# Patient Record
Sex: MALE | Race: WHITE | NOT HISPANIC OR LATINO | URBAN - METROPOLITAN AREA
[De-identification: names, ages, dates, MRNs, and addresses within clinical notes are randomized per-mention and may not be internally consistent; named-entity substitution may affect disease eponyms.]

---

## 2020-06-28 ENCOUNTER — HOSPITAL ENCOUNTER (EMERGENCY)
Facility: HOSPITAL | Age: 65
Discharge: HOME | End: 2020-06-28
Attending: EMERGENCY MEDICINE | Admitting: EMERGENCY MEDICINE
Payer: COMMERCIAL

## 2020-06-28 VITALS
HEIGHT: 73 IN | RESPIRATION RATE: 19 BRPM | SYSTOLIC BLOOD PRESSURE: 110 MMHG | HEART RATE: 85 BPM | TEMPERATURE: 97.3 F | DIASTOLIC BLOOD PRESSURE: 61 MMHG | WEIGHT: 193 LBS | BODY MASS INDEX: 25.58 KG/M2 | OXYGEN SATURATION: 92 %

## 2020-06-28 DIAGNOSIS — S61.313A LACERATION OF LEFT MIDDLE FINGER WITHOUT FOREIGN BODY WITH DAMAGE TO NAIL, INITIAL ENCOUNTER: Primary | ICD-10-CM

## 2020-06-28 PROCEDURE — 99282 EMERGENCY DEPT VISIT SF MDM: CPT | Performed by: PHYSICIAN ASSISTANT

## 2020-06-28 PROCEDURE — 90715 TDAP VACCINE 7 YRS/> IM: CPT | Performed by: PHYSICIAN ASSISTANT

## 2020-06-28 PROCEDURE — 99283 EMERGENCY DEPT VISIT LOW MDM: CPT

## 2020-06-28 PROCEDURE — 12001 RPR S/N/AX/GEN/TRNK 2.5CM/<: CPT | Performed by: PHYSICIAN ASSISTANT

## 2020-06-28 PROCEDURE — 90471 IMMUNIZATION ADMIN: CPT

## 2020-06-28 RX ORDER — LISINOPRIL 20 MG/1
20 TABLET ORAL DAILY
COMMUNITY

## 2020-06-28 RX ORDER — LIDOCAINE HYDROCHLORIDE 10 MG/ML
5 INJECTION, SOLUTION EPIDURAL; INFILTRATION; INTRACAUDAL; PERINEURAL ONCE
Status: COMPLETED | OUTPATIENT
Start: 2020-06-28 | End: 2020-06-28

## 2020-06-28 RX ORDER — OMEPRAZOLE 40 MG/1
40 CAPSULE, DELAYED RELEASE ORAL DAILY
COMMUNITY

## 2020-06-28 RX ADMIN — LIDOCAINE HYDROCHLORIDE 5 ML: 10 INJECTION, SOLUTION EPIDURAL; INFILTRATION; INTRACAUDAL; PERINEURAL at 13:39

## 2020-06-28 RX ADMIN — TETANUS TOXOID, REDUCED DIPHTHERIA TOXOID AND ACELLULAR PERTUSSIS VACCINE, ADSORBED 0.5 ML: 5; 2.5; 8; 8; 2.5 SUSPENSION INTRAMUSCULAR at 13:39

## 2020-06-28 NOTE — ED PROVIDER NOTES
History  Chief Complaint   Patient presents with    Finger Injury     pt states he caught his left middle finger in the hedge clipper about an hour ago  pt went to urgent care and was sent here for sutures  17-year-old male with history of diabetes and hypertension presents emergency department for evaluation of left middle finger laceration  He struck the finger on head clippers while working outside  Bleeding is controlled, does not take any anticoagulants  Last tetanus is unknown  Prior to Admission Medications   Prescriptions Last Dose Informant Patient Reported? Taking? UNKNOWN TO PATIENT   Yes Yes   insulin aspart (NovoLOG) 100 units/mL injection   Yes Yes   Sig: Inject 8 Units under the skin 3 (three) times a day before meals   lisinopril (ZESTRIL) 20 mg tablet   Yes No   Sig: Take 20 mg by mouth daily   omeprazole (PriLOSEC) 40 MG capsule   Yes No   Sig: Take 40 mg by mouth daily      Facility-Administered Medications: None       Past Medical History:   Diagnosis Date    Diabetes mellitus ([facility] [address] )     Hypertension        Past Surgical History:   Procedure Laterality Date    ABDOMINAL SURGERY      CHOLECYSTECTOMY      PANCREAS SURGERY         History reviewed  No pertinent family history  I have reviewed and agree with the history as documented  E-Cigarette/Vaping     E-Cigarette/Vaping Substances     Social History     Tobacco Use    Smoking status: Never Smoker    Smokeless tobacco: Never Used   Substance Use Topics    Alcohol use: Yes     Frequency: 4 or more times a week    Drug use: Never       Review of Systems   Constitutional: Negative for fever  Musculoskeletal: Negative for arthralgias, joint swelling and myalgias  Skin: Positive for wound  Negative for color change  Neurological: Negative for weakness and numbness  All other systems reviewed and are negative  Physical Exam  Physical Exam   Constitutional: He is oriented to person, place, and time   He appears well-developed and well-nourished  No distress  HENT:   Head: Normocephalic and atraumatic  Eyes: Pupils are equal, round, and reactive to light  No scleral icterus  Neck: No JVD present  Cardiovascular: Normal rate and regular rhythm  Exam reveals no gallop and no friction rub  No murmur heard  Pulmonary/Chest: No respiratory distress  He has no wheezes  He has no rales  Musculoskeletal:   Parallel 1 5 cm lacerations to the left middle finger pad  The lateral of which extends through the nail plate and bed  Bleeding is controlled with pressure  Range of motion of the interphalangeal joints is normal   Neurological: He is alert and oriented to person, place, and time  Skin: Skin is warm and dry  Capillary refill takes less than 2 seconds  He is not diaphoretic  Psychiatric: He has a normal mood and affect  His behavior is normal    Vitals reviewed        Vital Signs  ED Triage Vitals   Temperature Pulse Respirations Blood Pressure SpO2   06/28/20 1255 06/28/20 1250 06/28/20 1250 06/28/20 1250 06/28/20 1250   (!) 97 3 °F (36 3 °C) 85 19 110/61 92 %      Temp Source Heart Rate Source Patient Position - Orthostatic VS BP Location FiO2 (%)   06/28/20 1255 06/28/20 1250 06/28/20 1250 06/28/20 1250 --   Tympanic Monitor Lying Right arm       Pain Score       06/28/20 1250       3           Vitals:    06/28/20 1250   BP: 110/61   Pulse: 85   Patient Position - Orthostatic VS: Lying         Visual Acuity      ED Medications  Medications   lidocaine (PF) (XYLOCAINE-MPF) 1 % injection 5 mL (5 mL Infiltration Given by Other 6/28/20 5049)   tetanus-diphtheria-acellular pertussis (BOOSTRIX) IM injection 0 5 mL (0 5 mL Intramuscular Given 6/28/20 1339)       Diagnostic Studies  Results Reviewed     None                 No orders to display              Procedures  Laceration repair  Date/Time: 6/28/2020 1:50 PM  Performed by: Marlyne Paget, PA-C  Authorized by: Marlyne Paget, PA-C   Consent: Verbal consent obtained  Risks and benefits: risks, benefits and alternatives were discussed  Consent given by: patient  Patient understanding: patient states understanding of the procedure being performed  Patient consent: the patient's understanding of the procedure matches consent given  Procedure consent: procedure consent matches procedure scheduled  Relevant documents: relevant documents present and verified  Required items: required blood products, implants, devices, and special equipment available  Patient identity confirmed: verbally with patient  Time out: Immediately prior to procedure a "time out" was called to verify the correct patient, procedure, equipment, support staff and site/side marked as required  Body area: upper extremity  Location details: left long finger  Laceration length: 1 5 cm  Foreign bodies: no foreign bodies  Tendon involvement: none  Nerve involvement: none  Vascular damage: no  Anesthesia: digital block    Anesthesia:  Local Anesthetic: lidocaine 1% without epinephrine  Anesthetic total: 3 mL    Sedation:  Patient sedated: no        Procedure Details:  Preparation: Patient was prepped and draped in the usual sterile fashion  Irrigation solution: saline  Irrigation method: syringe  Amount of cleaning: standard  Debridement: none  Degree of undermining: none  Skin closure: 5-0 nylon  Number of sutures: 4  Technique: simple  Approximation: close  Approximation difficulty: simple  Dressing: 4x4 sterile gauze  Patient tolerance: Patient tolerated the procedure well with no immediate complications               ED Course       US AUDIT      Most Recent Value   Initial Alcohol Screen: US AUDIT-C    1  How often do you have a drink containing alcohol?  0 Filed at: 06/28/2020 1251   2  How many drinks containing alcohol do you have on a typical day you are drinking? 0 Filed at: 06/28/2020 1251   3a  Male UNDER 65: How often do you have five or more drinks on one occasion?   0 Filed at: 06/28/2020 1251   3b  FEMALE Any Age, or MALE 65+: How often do you have 4 or more drinks on one occassion? 0 Filed at: 06/28/2020 1251   Audit-C Score  0 Filed at: 06/28/2020 1251                  AMY/DAST-10      Most Recent Value   How many times in the past year have you    Used an illegal drug or used a prescription medication for non-medical reasons? Never Filed at: 06/28/2020 1251                                [facility]  Number of Diagnoses or Management Options  Laceration of left middle finger without foreign body with damage to nail, initial encounter: new and does not require workup  Diagnosis management comments: Simple laceration repaired with external sutures  Tetanus status updated       Amount and/or Complexity of Data Reviewed  Review and summarize past medical records: yes          Disposition  Final diagnoses:   Laceration of left middle finger without foreign body with damage to nail, initial encounter     Time reflects when diagnosis was documented in both MDM as applicable and the Disposition within this note     Time User Action Codes Description Comment    6/28/2020  2:04 PM Vishnu Mclean Add [B28 [id number]] Laceration of left middle finger without foreign body with damage to nail, initial encounter       ED Disposition     ED Disposition Condition Date/Time Comment    Discharge Stable Sun Jun 28, 2020  2:04 PM Fab Serrano discharge to home/self care              Follow-up Information     Follow up With Specialties Details Why Contact Info Additional Information     [address] Emergency Department Emergency Medicine  As needed [address] [place] [id number] [phone] [address] ED, [address] [place] [address] 10          Discharge Medication List as of 6/28/2020  2:05 PM      CONTINUE these medications which have NOT CHANGED    Details   insulin aspart (NovoLOG) 100 units/mL injection Inject 8 Units under the skin 3 (three) times a day before meals, Historical Med      UNKNOWN TO PATIENT Historical Med      lisinopril (ZESTRIL) 20 mg tablet Take 20 mg by mouth daily, Historical Med      omeprazole (PriLOSEC) 40 MG capsule Take 40 mg by mouth daily, Historical Med           No discharge procedures on file      PDMP Review     None          ED Provider  Electronically Signed by           Eugene Arroyo PA-C  06/28/20 5571

## 2021-03-10 DIAGNOSIS — Z23 ENCOUNTER FOR IMMUNIZATION: ICD-10-CM
